# Patient Record
Sex: MALE | Race: WHITE | NOT HISPANIC OR LATINO | Employment: UNEMPLOYED | URBAN - METROPOLITAN AREA
[De-identification: names, ages, dates, MRNs, and addresses within clinical notes are randomized per-mention and may not be internally consistent; named-entity substitution may affect disease eponyms.]

---

## 2021-03-02 ENCOUNTER — OFFICE VISIT - HEALTHEAST (OUTPATIENT)
Dept: OTOLARYNGOLOGY | Facility: CLINIC | Age: 21
End: 2021-03-02

## 2021-03-02 DIAGNOSIS — S02.2XXA CLOSED FRACTURE OF NASAL BONE, INITIAL ENCOUNTER: ICD-10-CM

## 2021-03-02 DIAGNOSIS — J01.00 ACUTE NON-RECURRENT MAXILLARY SINUSITIS: ICD-10-CM

## 2021-03-05 ENCOUNTER — OFFICE VISIT - HEALTHEAST (OUTPATIENT)
Dept: OTOLARYNGOLOGY | Facility: CLINIC | Age: 21
End: 2021-03-05

## 2021-03-05 ENCOUNTER — AMBULATORY - HEALTHEAST (OUTPATIENT)
Dept: FAMILY MEDICINE | Facility: CLINIC | Age: 21
End: 2021-03-05

## 2021-03-05 DIAGNOSIS — Z11.59 ENCOUNTER FOR SCREENING FOR OTHER VIRAL DISEASES: ICD-10-CM

## 2021-03-05 DIAGNOSIS — S02.2XXD CLOSED FRACTURE OF NASAL BONE WITH ROUTINE HEALING, SUBSEQUENT ENCOUNTER: ICD-10-CM

## 2021-03-06 LAB
SARS-COV-2 PCR COMMENT: NORMAL
SARS-COV-2 RNA SPEC QL NAA+PROBE: NEGATIVE
SARS-COV-2 VIRUS SPECIMEN SOURCE: NORMAL

## 2021-03-07 ENCOUNTER — COMMUNICATION - HEALTHEAST (OUTPATIENT)
Dept: SCHEDULING | Facility: CLINIC | Age: 21
End: 2021-03-07

## 2021-03-08 ASSESSMENT — MIFFLIN-ST. JEOR: SCORE: 2023.23

## 2021-03-09 ENCOUNTER — ANESTHESIA - HEALTHEAST (OUTPATIENT)
Dept: SURGERY | Facility: AMBULATORY SURGERY CENTER | Age: 21
End: 2021-03-09

## 2021-03-10 ENCOUNTER — SURGERY - HEALTHEAST (OUTPATIENT)
Dept: SURGERY | Facility: AMBULATORY SURGERY CENTER | Age: 21
End: 2021-03-10

## 2021-03-16 ENCOUNTER — OFFICE VISIT - HEALTHEAST (OUTPATIENT)
Dept: OTOLARYNGOLOGY | Facility: CLINIC | Age: 21
End: 2021-03-16

## 2021-03-16 DIAGNOSIS — Z98.890 POSTOPERATIVE STATE: ICD-10-CM

## 2021-05-29 ENCOUNTER — HEALTH MAINTENANCE LETTER (OUTPATIENT)
Age: 21
End: 2021-05-29

## 2021-06-05 VITALS — BODY MASS INDEX: 29.12 KG/M2 | WEIGHT: 215 LBS | HEIGHT: 72 IN

## 2021-06-15 NOTE — ANESTHESIA PREPROCEDURE EVALUATION
Anesthesia Evaluation      Patient summary reviewed   No history of anesthetic complications     Airway   Mallampati: I  Neck ROM: full   Pulmonary - negative ROS and normal exam                          Cardiovascular - negative ROS and normal exam  Exercise tolerance: > or = 4 METS   Neuro/Psych - negative ROS     Endo/Other       Comments: Closed nasal fracture    GI/Hepatic/Renal - negative ROS      Other findings: COVID PCR NEG 3/5/21      Dental - normal exam                        Anesthesia Plan  Planned anesthetic: general endotracheal and total IV anesthesia  Ketamine  Decadron 10 mg  Zofran  ASA 1   Induction: intravenous   Anesthetic plan and risks discussed with: patient  Anesthesia plan special considerations: antiemetics,   Post-op plan: routine recovery

## 2021-06-15 NOTE — ANESTHESIA POSTPROCEDURE EVALUATION
Patient: Heber Liang  Procedure(s):  CLOSED REDUCTION, FRACTURE, NASAL BONE  Anesthesia type: general    Patient location: Phase II Recovery  Last vitals:   Vitals Value Taken Time   /80 03/10/21 1135   Temp 36.6  C (97.9  F) 03/10/21 1110   Pulse 70 03/10/21 1139   Resp 16 03/10/21 1110   SpO2 95 % 03/10/21 1139   Vitals shown include unvalidated device data.  Post vital signs: stable  Level of consciousness: awake, alert and oriented  Post-anesthesia pain: pain controlled  Post-anesthesia nausea and vomiting: no  Pulmonary: unassisted, return to baseline  Cardiovascular: stable and blood pressure at baseline  Hydration: adequate  Anesthetic events: no    QCDR Measures:  ASA# 11 - Carolann-op Cardiac Arrest: ASA11B - Patient did NOT experience unanticipated cardiac arrest  ASA# 12 - Carolann-op Mortality Rate: ASA12B - Patient did NOT die  ASA# 13 - PACU Re-Intubation Rate: ASA13B - Patient did NOT require a new airway mgmt  ASA# 10 - Composite Anes Safety: ASA10A - No serious adverse event    Additional Notes:

## 2021-06-15 NOTE — ANESTHESIA CARE TRANSFER NOTE
Last vitals:   Vitals:    03/10/21 1053   BP: 150/87   Pulse: 95   Resp: 16   Temp: 36.4  C (97.6  F)   SpO2: 100%     Patient's level of consciousness is drowsy  Spontaneous respirations: yes  Maintains airway independently: yes  Dentition unchanged: yes  Oropharynx: oropharynx clear of all foreign objects    QCDR Measures:  ASA# 20 - Surgical Safety Checklist: WHO surgical safety checklist completed prior to induction    PQRS# 430 - Adult PONV Prevention: 4558F - Pt received => 2 anti-emetic agents (different classes) preop & intraop  ASA# 8 - Peds PONV Prevention: 4558F - Pt received => 2 anti-emetic agents (different classes) preop & intraop  PQRS# 424 - Carolann-op Temp Management: 4559F - At least one body temp DOCUMENTED => 35.5C or 95.9F within required timeframe  PQRS# 426 - PACU Transfer Protocol: - Transfer of care checklist used  ASA# 14 - Acute Post-op Pain: ASA14B - Patient did NOT experience pain >= 7 out of 10

## 2021-06-16 PROBLEM — S02.2XXD CLOSED FRACTURE OF NASAL BONE WITH ROUTINE HEALING, SUBSEQUENT ENCOUNTER: Status: ACTIVE | Noted: 2021-03-05

## 2021-06-18 NOTE — PATIENT INSTRUCTIONS - HE
Patient Instructions by Thong Fisher MD at 3/2/2021  3:00 PM     Author: Thong Fisher MD Service: -- Author Type: Physician    Filed: 3/2/2021  3:36 PM Encounter Date: 3/2/2021 Status: Signed    : Thong Fisher MD (Physician)       No nose blowing for next 2 weeks  Sneeze with mouth open   Nasal saline spray 3x daily for 1 week  Medrol dosepack (start in AM)  Z-pack (start today)    Return visit Friday for recheck

## 2021-06-30 NOTE — PROGRESS NOTES
Progress Notes by Thong Fisher MD at 3/5/2021  3:20 PM     Author: Thong Fisher MD Service: -- Author Type: Physician    Filed: 3/8/2021  3:56 PM Encounter Date: 3/5/2021 Status: Signed    : Thong Fisher MD (Physician)       REASON FOR VISIT: Recheck      HISTORY OF PRESENT ILLNESS    Heber was seen in follow up for recheck of nasal fracture after trial of predisone.     Medications Ordered   Medications   ? azithromycin (ZITHROMAX Z-FERCHO) 250 MG tablet       Sig: Take 2 tablets (500 mg) on  Day 1,  followed by 1 tablet (250 mg) once daily on Days 2 through 5.       Dispense:  6 tablet       Refill:  0   ? methylPREDNISolone (MEDROL DOSEPACK) 4 mg tablet       Sig: Follow package directions       Dispense:  21 tablet       Refill:  0       Take in AM with food       Patient has evidence of nasal fracture with deviated nasal septum fracture also involving the inferior orbit on the left-hand side as well as the anterior aspect of the maxillary sinus in the left nasolacrimal duct.     Recommend trial of a #short course of Medrol Dosepak along with azithromycin.  Patient will be seen and reevaluated on Friday to discuss whether or not he needs close reduction of the nasal fracture.  He is not having any double vision or decreased vision and I have reassured him that the orbital blowout fracture should heal on its own.  We may want to do a follow-up CT in 3 or 4 months down the road.  All questions were answered is agreeable this plan of care is warm he is encouraged not to blow his nose for the next 2 weeks to avoid blowing air into his orbital cavity.       REVIEW OF SYSTEMS    Review of Systems: a 10-system review is reviewed at this encounter.  See scanned document.     Patient has no known allergies.     PHYSICAL EXAM:        HEAD: Normal appearance and symmetry:  No cutaneous lesions.      EARS:   Auricles normal         NOSE:    Dorsum:   Left nasal sidewall depressed (see photo)  Septum: deviated  left             ORAL CAVITY/OROPHARYNX:    Lips:  Normal.     NECK:  Trachea:  midline    CV: RRR    Chest:  Clear     ABD:  Soft    NEURO:   Alert and Oriented    GAIT AND STATION:  normal     RESPIRATORY:   Symmetry and Respiratory effort    PSYCH:   normal mood and affect    SKIN:  warm and dry         IMPRESSION:    Encounter Diagnosis   Name Primary?   ? Closed fracture of nasal bone with routine healing, subsequent encounter Yes          RECOMMENDATIONS:      Orders Placed This Encounter   Procedures   ? COVID-19 Virus (Coronavirus) Antibody & Titer Reflex     Standing Status:   Future     Standing Expiration Date:   3/5/2022     Order Specific Question:   Healthcare worker?     Answer:   No     Order Specific Question:   If no     Answer:   Pre-procedure     Order Specific Question:   Date of procedure     Answer:   3/10/2021     Order Specific Question:   Hold for 7 day release to [facility]     Answer:   Reasonable likelihood for causing patient harm      No medications were ordered this encounter     Recommend closed reduction in OR. Will plan for next Wednesday afternoon or late morning    He is medically stable for this procedure     COVID 19 test to be done today.

## 2021-06-30 NOTE — PROGRESS NOTES
Progress Notes by Thong Fisher MD at 3/16/2021  3:00 PM     Author: Thong Fisher MD Service: -- Author Type: Physician    Filed: 3/16/2021  4:20 PM Encounter Date: 3/16/2021 Status: Signed    : Thong Fisher MD (Physician)       REASON FOR VISIT: Recheck      HISTORY OF PRESENT ILLNESS    Heber was seen in follow up for nasal cast removal after closed reduction last week.  He has no concerns.  He is happy cosmetically happy with the result.  He can breathe through the nose with no problems with nasal obstruction or congestion.       REVIEW OF SYSTEMS    Review of Systems: a 10-system review is reviewed at this encounter.  See scanned document.     Patient has no known allergies.     PHYSICAL EXAM:        HEAD: Normal appearance and symmetry:  No cutaneous lesions.      EARS:   Auricles normal         NOSE:    Dorsum:   Straight    Left nasal sidewall is in good anatomic position.  There is some mild septal deviation to the left and moderate inferior turbinate hypertrophy             ORAL CAVITY/OROPHARYNX:    Lips:  Normal.     NECK:  Trachea:  midline       NEURO:   Alert and Oriented    GAIT AND STATION:  normal     RESPIRATORY:   Symmetry and Respiratory effort    PSYCH:   normal mood and affect    SKIN:  warm and dry         IMPRESSION:    Encounter Diagnosis   Name Primary?   ? Postoperative state Yes          RECOMMENDATIONS:      Advised in that his nasal bones will take another 2 weeks to fully knit.  Recommend to avoid any contact sports.  All questions were answered he is agreeable to this plan of care
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
## 2021-09-18 ENCOUNTER — HEALTH MAINTENANCE LETTER (OUTPATIENT)
Age: 21
End: 2021-09-18

## 2022-06-25 ENCOUNTER — HEALTH MAINTENANCE LETTER (OUTPATIENT)
Age: 22
End: 2022-06-25

## 2022-11-20 ENCOUNTER — HEALTH MAINTENANCE LETTER (OUTPATIENT)
Age: 22
End: 2022-11-20

## 2023-07-08 ENCOUNTER — HEALTH MAINTENANCE LETTER (OUTPATIENT)
Age: 23
End: 2023-07-08